# Patient Record
(demographics unavailable — no encounter records)

---

## 2024-11-26 NOTE — PHYSICAL EXAM
[Normal] : Gait: normal [de-identified] : Right Elbow: Elbow:  Range of Motion in Degrees: 	                                           Claimant:	Normal:	 Flexion (Active)	                                 170	170	 Flexion (Passive)	                                 170	170	 Extension(Active)	                                   0	  0	 Extension (Passive)	                   0	  0	 Pronation/Supination (Active)	                0-180	0-180	 Pronation/Supination (Passive)	0-180	0-180	  Tenderness to palpation over the lateral epicondyle.  No tenderness to palpation over the medial epicondyle.  Pain with resisted dorsi and palmar flexion with .  No tenderness over the distal biceps.  No tenderness over the olecranon.  No swelling over the olecranon.  No instability to varus or valgus stress at 0, 30, 60 and 90 degrees.  No instability in the AP plane.  No motor or sensory deficits. 2+ radial and ulnar pulses.  Skin is intact.  No rashes, scars or lesions.   Left Elbow: Elbow: Range of Motion in Degrees 	                                          Claimant:	Normal:	 Flexion (Active)	                          170	170	 Flexion (Passive)	                          170	170	 Extension(Active)	                           0	                 0	 Extension (Passive)	           0	                 0	 Pronation/Supination (Active)	           0-180	 0-180	 Pronation/Supination (Passive)          0-180             0-180	  No tenderness to palpation over the medial and lateral epicondyle.  No pain with resisted dorsi or palmar flexion with .  No tenderness over the distal biceps.  No tenderness over the olecranon.  No swelling over the olecranon.  No instability to varus or valgus stress at 0, 30, 60 and 90 degrees.  No instability in the AP plane.  No motor or sensory deficits.  2+ radial and ulnar pulses.  Skin is intact.  No rashes, scars or lesions.   [de-identified] : Station:  Normal. [de-identified] : Appearance:  Well-developed, well-nourished male in no acute distress.   [de-identified] : Radiographs, three views of the right elbow taken in the office today, reveal no acute fractures or dislocations.

## 2024-11-26 NOTE — DISCUSSION/SUMMARY
[de-identified] : The patient presents with lateral epicondylitis of the right elbow.  The patient was given a cortisone injection.  He is advised to ice the elbow and return back to the office in one week.

## 2024-11-26 NOTE — ADDENDUM
[FreeTextEntry1] : This note was written by Shannan Oakes on 11/26/2024 acting as scribe for Talya Townsend, OTR/L, PA

## 2024-11-26 NOTE — PROCEDURE
[de-identified] : Indication: Lateral epicondylitis right elbow   Consent: At this time, I have recommended an injection to the right elbow.  The risks and benefits of the procedure were discussed with the patient in detail.  Upon verbal consent of the patient, we proceeded with the injection as noted below.   Additionally, due to diabetes, the patient has been informed that cortisone may cause a spike in blood sugar.  In light of this, the patient wishes to proceed with the cortisone injection.   Description of Procedure: After a sterile prep, the patient underwent an injection of approximately 9 mL of 1% Lidocaine (10 mg/mL) without epinephrine and 1 mL of triamcinolone acetonide (40 mg/mL) into the right elbow.  The patient tolerated the procedure well.  There were no complications.   :  [employer] Drug Name:  Triamcinolone Acetonide Injectable Suspension USP NCD#:  [address]-6 Lot#:  [id number] Expiration Date:  08/31/2025

## 2025-04-29 NOTE — PHYSICAL EXAM
[de-identified] : Right Elbow: He is significantly tender at the triceps insertion, as well as, over the ulnar collateral ligament and over the radial collateral ligament. He cannot fully extend it. He can't do any extension of the elbow. He can actually supinate and pronate. He has no pain with flexion, supination or pronation. The bicep is fully intact at this time. He has significant swelling to the right elbow. Neurologic and neurovascular exam is within normal limits.  [de-identified] : Ambulating with a normal gait. [de-identified] : Appearance:  Well-developed, well-nourished male in no acute distress.   [de-identified] : Radiographs, which were taken in the office today, two views of the right elbow, reveals no acute fractures or dislocations noted.

## 2025-04-29 NOTE — ADDENDUM
[FreeTextEntry1] : This note was written by Nubia Carroll on 04/23/2025 acting as scribe for Talya Townsend, OTR/L, PA

## 2025-04-29 NOTE — DISCUSSION/SUMMARY
[de-identified] : At this time, due to a possible right ulnar collateral ligament tear, he will get an MRI as soon as possible to rule out a tear. We put him in some ice today, as well as a sling.

## 2025-04-29 NOTE — PHYSICAL EXAM
[de-identified] :  Right elbow -F he has full flexion of the elbow.  He is able to extend to 10 degrees.  Pain with pronosupination but no limitations with passive and active pronosupination - Severe tenderness directly over the common extensor origin -There is ecchymosis about his lateral arm, very mild medial ecchymosis as well -Pain with resisted wrist extension -No tenderness palpation over the radial tunnel - Negative tabletop test he is able to raise his body using his arm without pain Negative lateral pivot shift test limited by pain Negative hook test.  No tenderness over the medial epicondyl-e. [de-identified] : MRI reviewed, MRI obtained 4 - 25 - 25, personally interpreted today is demonstrate complete rupture of the common extensor origin of the lateral epicondyle.  There is small tearing noted of the ulnar collateral ligament of the lateral ligament complex as well as the MCL.  Biceps tendon appears intact  3view x-ray right elbow 4- including AP lateral bleak, these are personally interpreted today demonstrate no fractures or arthritic change

## 2025-04-29 NOTE — ASSESSMENT
[FreeTextEntry1] : - Examination and history today consistent with complete rupture of common extensor origin.  Has a history of lateral epicondylitis treated with corticosteroid injection.  He felt a pop which likely represented rupture of the extensor origin.  He does not have any examination signs today of posterior lateral rotatory instability but there are small tearing of his ulnar collateral ligament lateral. - We discussed complete rupture of the common extensor origin.  There is slight retraction seen on his MRI as well.  We discussed that he is a candidate for surgical repair.  He is offered surgery today. he states he is not able to undergo surgery at this time due to his current cardiac conditions pending a ablation for his atrial fibrillation as well as prior multiple vessel cardiac bypass. - We discussed this may cause weakness of his elbow wrist especially wrist extension.  This may lead to chronic pain if not addressed.  Please state as the tendon becomes more retracted and may not become repairable at the time.  He verbalizes understanding of this. - At this time point given patient's inability to undergo surgery we will have worked with therapy for range of motion as well as strengthening exercises.  I did recommend a wrist brace at this time to inhibit motion of the wrist extensors  - He will follow-up in 4 weeks for repeat x-rays and examination

## 2025-04-29 NOTE — DISCUSSION/SUMMARY
[de-identified] : At this time, due to a possible right ulnar collateral ligament tear, he will get an MRI as soon as possible to rule out a tear. We put him in some ice today, as well as a sling.

## 2025-04-29 NOTE — HISTORY OF PRESENT ILLNESS
[de-identified] : The patient comes in today for right elbow pain. He states he was raking; he heard a pop in his elbow and now he can't fully extend the elbow.

## 2025-04-29 NOTE — HISTORY OF PRESENT ILLNESS
[FreeTextEntry1] : 67-year-old male presenting for evaluation right elbow injury.  Injury occurred April 22.  He was raking and felt a pop in his lateral arm.  He had immediate pain swelling and ecchymosis.  He was seen by another provider.  He was sent for an MRI he is here today for review of his MRI as well as further treatment plans.  He is right-hand dominant.  He has a history of tennis elbow he has received a corticosteroid injection into the lateral epicondyle several months ago which improved his symptoms.  He has a history of severe cardiac disease he had a quadruple bypass 3 years ago.  He has recently developed A-fib and he is scheduled for an ablation in the next month.  He is on anticoagulation.

## 2025-04-29 NOTE — HISTORY OF PRESENT ILLNESS
[de-identified] : The patient comes in today for right elbow pain. He states he was raking; he heard a pop in his elbow and now he can't fully extend the elbow.

## 2025-04-29 NOTE — PHYSICAL EXAM
[de-identified] :  Right elbow -F he has full flexion of the elbow.  He is able to extend to 10 degrees.  Pain with pronosupination but no limitations with passive and active pronosupination - Severe tenderness directly over the common extensor origin -There is ecchymosis about his lateral arm, very mild medial ecchymosis as well -Pain with resisted wrist extension -No tenderness palpation over the radial tunnel - Negative tabletop test he is able to raise his body using his arm without pain Negative lateral pivot shift test limited by pain Negative hook test.  No tenderness over the medial epicondyl-e. [de-identified] : MRI reviewed, MRI obtained 4 - 25 - 25, personally interpreted today is demonstrate complete rupture of the common extensor origin of the lateral epicondyle.  There is small tearing noted of the ulnar collateral ligament of the lateral ligament complex as well as the MCL.  Biceps tendon appears intact  3view x-ray right elbow 4- including AP lateral bleak, these are personally interpreted today demonstrate no fractures or arthritic change

## 2025-04-29 NOTE — PHYSICAL EXAM
[de-identified] : Right Elbow: He is significantly tender at the triceps insertion, as well as, over the ulnar collateral ligament and over the radial collateral ligament. He cannot fully extend it. He can't do any extension of the elbow. He can actually supinate and pronate. He has no pain with flexion, supination or pronation. The bicep is fully intact at this time. He has significant swelling to the right elbow. Neurologic and neurovascular exam is within normal limits.  [de-identified] : Ambulating with a normal gait. [de-identified] : Appearance:  Well-developed, well-nourished male in no acute distress.   [de-identified] : Radiographs, which were taken in the office today, two views of the right elbow, reveals no acute fractures or dislocations noted.

## 2025-05-27 NOTE — HISTORY OF PRESENT ILLNESS
[FreeTextEntry1] : 67-year-old male here for 1 month follow-up.  He states his elbow is feeling significantly better.  He still having some lateral elbow pain.  Has been working with therapies going twice a week.  He states that he is increased his wrist strength and motion.  Occasional pain.  He has been dealing with a A-fib exacerbation

## 2025-05-27 NOTE — ASSESSMENT
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  He is now approximately 5 to 6 weeks out from the initial injury.  There is still resolving ecchymosis.  I again reinforced the only way to repair/heal the extensor origin would be surgical repair due to the retraction, the level of retraction can likely be seen on new x-rays today with the bony rudy.  If he is painless he has good range of motion given patient's age and functional status he is agreeable that he would like to continue with nonoperative management at this time.  Certainly if things persist we could consider debridement, although repair not likely possible given timeline as well as degree of retraction.  He does have good strength with wrist extension which we discussed is the main function.  There is no elbow instability and he is able to hold himself up off the table.  He has multiple cardiac morbidities which makes him a very high risk surgical candidate.  He does not want to pursue any surgery at this time.  He is going to continue therapy and do exercises on his own as well.

## 2025-05-27 NOTE — PHYSICAL EXAM
[de-identified] :  Right elbow -F he has full flexion of the elbow.  Full extension of the elbow.  Full painless pronosupination - Mild tenderness over the common extensor origin - Resolving ecchymosis throughout the lateral arm and forearm -Pain with resisted wrist extension, minimal today -No tenderness palpation over the radial tunnel - Negative tabletop test he is able to raise his body using his arm without pain Negative lateral pivot shift test limited by pain Negative hook test.  No tenderness over the medial epicondyl-e. [de-identified] : Three-view x-ray right elbow including AP, lateral, oblique taken today and personally viewed these demonstrate bony rudy over the radius dorsally likely representing extensor avulsion